# Patient Record
Sex: FEMALE | Race: WHITE | Employment: FULL TIME | URBAN - METROPOLITAN AREA
[De-identification: names, ages, dates, MRNs, and addresses within clinical notes are randomized per-mention and may not be internally consistent; named-entity substitution may affect disease eponyms.]

---

## 2023-09-10 ENCOUNTER — HOSPITAL ENCOUNTER (EMERGENCY)
Age: 58
Discharge: HOME | End: 2023-09-10
Attending: EMERGENCY MEDICINE
Payer: COMMERCIAL

## 2023-09-10 VITALS
SYSTOLIC BLOOD PRESSURE: 140 MMHG | HEIGHT: 66 IN | RESPIRATION RATE: 18 BRPM | TEMPERATURE: 98.7 F | WEIGHT: 118.39 LBS | BODY MASS INDEX: 19.03 KG/M2 | OXYGEN SATURATION: 99 % | HEART RATE: 82 BPM | DIASTOLIC BLOOD PRESSURE: 81 MMHG

## 2023-09-10 DIAGNOSIS — U07.1 COVID-19 VIRUS INFECTION: Primary | ICD-10-CM

## 2023-09-10 LAB — S PYO AG THROAT QL: NEGATIVE

## 2023-09-10 PROCEDURE — 87081 CULTURE SCREEN ONLY: CPT

## 2023-09-10 PROCEDURE — 87880 STREP A ASSAY W/OPTIC: CPT

## 2023-09-10 PROCEDURE — 99282 EMERGENCY DEPT VISIT SF MDM: CPT

## 2023-09-10 RX ORDER — IBUPROFEN 200 MG
400 TABLET ORAL
COMMUNITY
Start: 2015-06-30

## 2023-09-10 ASSESSMENT — PAIN DESCRIPTION - LOCATION: LOCATION: THROAT

## 2023-09-10 ASSESSMENT — PAIN DESCRIPTION - PAIN TYPE: TYPE: ACUTE PAIN

## 2023-09-10 ASSESSMENT — PAIN DESCRIPTION - FREQUENCY: FREQUENCY: CONTINUOUS

## 2023-09-10 ASSESSMENT — PAIN DESCRIPTION - DESCRIPTORS: DESCRIPTORS: ACHING;SORE

## 2023-09-10 ASSESSMENT — LIFESTYLE VARIABLES
HOW MANY STANDARD DRINKS CONTAINING ALCOHOL DO YOU HAVE ON A TYPICAL DAY: 1 OR 2
HOW OFTEN DO YOU HAVE A DRINK CONTAINING ALCOHOL: 2-3 TIMES A WEEK

## 2023-09-10 ASSESSMENT — PAIN DESCRIPTION - ONSET: ONSET: PROGRESSIVE

## 2023-09-10 ASSESSMENT — PAIN - FUNCTIONAL ASSESSMENT: PAIN_FUNCTIONAL_ASSESSMENT: 0-10

## 2023-09-10 ASSESSMENT — PAIN SCALES - GENERAL: PAINLEVEL_OUTOF10: 10

## 2023-09-10 NOTE — ED PROVIDER NOTES
140/81. Her oxygen saturation 99%. She has some pharyngeal and tonsillar erythema but no exudate. No tonsillar enlargement. Minimal erythema of the soft palate and uvula with a midline uvula. No clinical evidence of peritonsillar abscess or retropharyngeal abscess. Normal phonation. Handling her secretions normally. No cervical adenopathy. Normal cardiopulmonary exam.    Strep screen is negative. She has known COVID-19 virus infection. She has no evidence of peritonsillar abscess. She has no evidence of retropharyngeal abscess. Her pharyngitis is viral, related to her COVID-19 infection. Symptomatic treatment with outpatient follow-up. Disposition Considerations (tests considered but not done, Admit vs D/C, Shared Decision Making, Pt Expectation of Test or Tx.): Discharge home with outpatient follow-up in 5 to 7 days if not improved. Tylenol and ibuprofen as needed for symptomatic relief of pain. Test results, diagnosis, and treatment plan were discussed with the patient. She understands the treatment plan follow-up as discussed. I am the Primary Clinician of Record.       PROCEDURES:  None    FINAL IMPRESSION      1. COVID-19 virus infection          DISPOSITION/PLAN   DISPOSITION Decision To Discharge 09/10/2023 10:55:30 AM      PATIENT REFERRED TO:  Qiana Wilson, [address]  [phone]    In 1 week  If not improved      DISCHARGE MEDICATIONS:  New Prescriptions    No medications on file       (Please note that portions of this note were completed with a voice recognition program.  Efforts were made to edit the dictations but occasionally words are mis-transcribed.)    Daija Tejeda MD  Attending Emergency Physician        Daija Tejeda MD  09/10/23 [id number]

## 2023-09-10 NOTE — ED TRIAGE NOTES
Patient to [facility] ambulatory alone - positive for Covid 3 days ago- sore throat with increasing pain-concerned about strept throat- runny nose, headache improving

## 2023-09-10 NOTE — DISCHARGE INSTRUCTIONS
IbuProfen or Tylenol every 6 hours as needed for pain. Follow-up in 5 to 7 days with your primary care provider if not improved.

## 2023-09-12 LAB — S PYO THROAT QL CULT: NORMAL
